# Patient Record
Sex: MALE | Race: WHITE | NOT HISPANIC OR LATINO
[De-identification: names, ages, dates, MRNs, and addresses within clinical notes are randomized per-mention and may not be internally consistent; named-entity substitution may affect disease eponyms.]

---

## 2018-10-17 PROBLEM — Z00.00 ENCOUNTER FOR PREVENTIVE HEALTH EXAMINATION: Status: ACTIVE | Noted: 2018-10-17

## 2018-11-20 ENCOUNTER — APPOINTMENT (OUTPATIENT)
Dept: CARDIOLOGY | Facility: CLINIC | Age: 18
End: 2018-11-20
Payer: COMMERCIAL

## 2018-11-20 ENCOUNTER — NON-APPOINTMENT (OUTPATIENT)
Age: 18
End: 2018-11-20

## 2018-11-20 VITALS
HEIGHT: 70 IN | HEART RATE: 111 BPM | DIASTOLIC BLOOD PRESSURE: 88 MMHG | SYSTOLIC BLOOD PRESSURE: 160 MMHG | WEIGHT: 151 LBS | BODY MASS INDEX: 21.62 KG/M2

## 2018-11-20 DIAGNOSIS — Z78.9 OTHER SPECIFIED HEALTH STATUS: ICD-10-CM

## 2018-11-20 DIAGNOSIS — Z82.49 FAMILY HISTORY OF ISCHEMIC HEART DISEASE AND OTHER DISEASES OF THE CIRCULATORY SYSTEM: ICD-10-CM

## 2018-11-20 PROCEDURE — 99204 OFFICE O/P NEW MOD 45 MIN: CPT

## 2018-11-20 PROCEDURE — 93000 ELECTROCARDIOGRAM COMPLETE: CPT

## 2018-11-20 NOTE — DISCUSSION/SUMMARY
[FreeTextEntry1] : Patient with sinus tachycardia, which he feels it is related to anxiety. He has labile blood pressure. He did subside after 10 minutes in the office. The heart however remains critical guard and then at rest. He had slight flushing of the face.\par \par For evaluation of labile hypertension and resting sinus tachycardia :\par - Check Holter recording. Check echocardiogram.\par - I have Given him a lab slip to check his TSH, CBC, electrolytes and serum metanephrines.\par - No medications were started today.\par - If he continues to have sinus tachycardia and flushing and high blood pressure, further evaluation may be necessary.\par \par The patient insists that his blood pressure at home is adequate. Also, his heart rate in the gym when he exercises his within normal limits before he starts exercising. No history of dizziness or syncope.\par \par \par \par Sincerely,\par Kemal Ott MD, FACC, RAJESH

## 2018-11-20 NOTE — HISTORY OF PRESENT ILLNESS
[FreeTextEntry1] : Dick is a pleasant 18-year-old male with no previous cardiovascular history. He is very active without exertional chest pain or shortness of breath or palpitations. He does have history of anxiety and sinus tachycardia and labile blood pressures, especially in doctor offices.\par \par Today, his resting heart rate was 111/min and initial blood pressure was 160/80 mmHg. After 10 minutes, the blood pressure subsided to 138/80. He does not have any palpitations other than when he is anxious. No dizziness or syncope. No exertional chest pain. No shortness of breath PND or orthopnea.

## 2018-11-20 NOTE — REASON FOR VISIT
[Consultation] : a consultation regarding [FreeTextEntry2] : Anxiety, sinus tachycardia, labile blood pressure

## 2018-11-20 NOTE — PHYSICAL EXAM
[General Appearance - Well Developed] : well developed [Normal Appearance] : normal appearance [Well Groomed] : well groomed [General Appearance - Well Nourished] : well nourished [No Deformities] : no deformities [General Appearance - In No Acute Distress] : no acute distress [Normal Conjunctiva] : the conjunctiva exhibited no abnormalities [Eyelids - No Xanthelasma] : the eyelids demonstrated no xanthelasmas [Normal Oral Mucosa] : normal oral mucosa [No Oral Pallor] : no oral pallor [No Oral Cyanosis] : no oral cyanosis [Murmurs] : no murmurs present [Edema] : no peripheral edema present [FreeTextEntry1] : S1-S2 is tachycardic and regular [Respiration, Rhythm And Depth] : normal respiratory rhythm and effort [Exaggerated Use Of Accessory Muscles For Inspiration] : no accessory muscle use [Auscultation Breath Sounds / Voice Sounds] : lungs were clear to auscultation bilaterally [Abdomen Soft] : soft [Abdomen Tenderness] : non-tender [Abnormal Walk] : normal gait [Gait - Sufficient For Exercise Testing] : the gait was sufficient for exercise testing [Nail Clubbing] : no clubbing of the fingernails [Cyanosis, Localized] : no localized cyanosis [Skin Color & Pigmentation] : normal skin color and pigmentation [Skin Turgor] : normal skin turgor [] : no rash [Oriented To Time, Place, And Person] : oriented to person, place, and time [Affect] : the affect was normal [Mood] : the mood was normal [Memory Recent] : recent memory was not impaired [No Anxiety] : not feeling anxious

## 2018-11-27 PROCEDURE — 93224 XTRNL ECG REC UP TO 48 HRS: CPT

## 2018-12-06 ENCOUNTER — APPOINTMENT (OUTPATIENT)
Dept: CARDIOLOGY | Facility: CLINIC | Age: 18
End: 2018-12-06
Payer: COMMERCIAL

## 2018-12-06 VITALS
HEIGHT: 70 IN | WEIGHT: 150 LBS | DIASTOLIC BLOOD PRESSURE: 62 MMHG | BODY MASS INDEX: 21.47 KG/M2 | SYSTOLIC BLOOD PRESSURE: 120 MMHG | HEART RATE: 123 BPM

## 2018-12-06 DIAGNOSIS — F41.9 ANXIETY DISORDER, UNSPECIFIED: ICD-10-CM

## 2018-12-06 PROCEDURE — 93306 TTE W/DOPPLER COMPLETE: CPT

## 2018-12-06 PROCEDURE — 99213 OFFICE O/P EST LOW 20 MIN: CPT

## 2018-12-06 NOTE — ASSESSMENT
[FreeTextEntry1] : - EKG 11/20/18: Sinus tachycardia. IRBBB\par - Holter recording November 2018: Average heart rate 71. No PAC or PVC. Appropriate transient sinus tachycardia.\par - Labs November 2018: Normal TSH and CBC. Metanephrine and normetanephrine levels were normal.

## 2018-12-06 NOTE — HISTORY OF PRESENT ILLNESS
[FreeTextEntry1] : Dick is a pleasant 18-year-old male with no previous cardiovascular history. He is very active without exertional chest pain or shortness of breath or palpitations. He does have history of anxiety and sinus tachycardia and labile blood pressures, especially in doctor offices.\par \par Today, his resting heart rate was 110/min and normal  blood pressure. On previous office visit, he had high blood pressure transiently which then subsided after resting for 10 minutes or so and also had sinus tachycardia. 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 Holter recording showed an average heart rate of 71 beats per minute\par \par He does not have any palpitations other than when he is anxious. No dizziness or syncope. No exertional chest pain. No shortness of breath PND or orthopnea Or flushing or sweats

## 2018-12-06 NOTE — DISCUSSION/SUMMARY
[FreeTextEntry1] : Patient with sinus tachycardia, which he feels it is related to anxiety. He has labile blood pressure. Blood In the office today is normal without any medications. He has sinus tachycardia when  he is anxious in the office however, 24-hour Holter monitor showed normal average heart rate when he is relaxed.\par \par He has good exercise capacity without any cardiac symptoms. Physical examination is unremarkable except sinus tachycardia.\par \par We discussed techniques for relaxation, meditation as well as biofeedback to relieve anxiety which will help his sinus  rate.\par \par The patient insists that his blood pressure at home is adequate. Also, his heart rate in the gym when he exercises his within normal limits before he starts exercising. No history of dizziness or syncope.\par \par Low-dose beta blockade can be tried for exaggerated sinus tachycardia although the patient does not have any symptoms of palpitations or any other symptoms from high heart rate.\par \par \par Sincerely,\par Kemal Ott MD, FACC, RAJESH

## 2018-12-06 NOTE — PHYSICAL EXAM
[General Appearance - Well Developed] : well developed [Normal Appearance] : normal appearance [Well Groomed] : well groomed [General Appearance - Well Nourished] : well nourished [No Deformities] : no deformities [General Appearance - In No Acute Distress] : no acute distress [Normal Conjunctiva] : the conjunctiva exhibited no abnormalities [Eyelids - No Xanthelasma] : the eyelids demonstrated no xanthelasmas [Normal Oral Mucosa] : normal oral mucosa [No Oral Pallor] : no oral pallor [No Oral Cyanosis] : no oral cyanosis [Respiration, Rhythm And Depth] : normal respiratory rhythm and effort [Exaggerated Use Of Accessory Muscles For Inspiration] : no accessory muscle use [Auscultation Breath Sounds / Voice Sounds] : lungs were clear to auscultation bilaterally [Murmurs] : no murmurs present [Edema] : no peripheral edema present [Abdomen Soft] : soft [Abdomen Tenderness] : non-tender [Abnormal Walk] : normal gait [Gait - Sufficient For Exercise Testing] : the gait was sufficient for exercise testing [Nail Clubbing] : no clubbing of the fingernails [Cyanosis, Localized] : no localized cyanosis [Skin Color & Pigmentation] : normal skin color and pigmentation [Skin Turgor] : normal skin turgor [] : no rash [Oriented To Time, Place, And Person] : oriented to person, place, and time [Affect] : the affect was normal [Mood] : the mood was normal [Memory Recent] : recent memory was not impaired [No Anxiety] : not feeling anxious [FreeTextEntry1] : S1-S2 is tachycardic and regular

## 2018-12-13 ENCOUNTER — APPOINTMENT (OUTPATIENT)
Dept: CARDIOLOGY | Facility: CLINIC | Age: 18
End: 2018-12-13

## 2018-12-13 ENCOUNTER — NON-APPOINTMENT (OUTPATIENT)
Age: 18
End: 2018-12-13

## 2018-12-13 DIAGNOSIS — R00.2 PALPITATIONS: ICD-10-CM

## 2018-12-13 DIAGNOSIS — R00.0 TACHYCARDIA, UNSPECIFIED: ICD-10-CM

## 2018-12-13 RX ORDER — AMOXICILLIN 875 MG/1
875 TABLET, FILM COATED ORAL
Qty: 20 | Refills: 0 | Status: ACTIVE | COMMUNITY
Start: 2018-12-10

## 2018-12-13 RX ORDER — PREDNISONE 50 MG/1
50 TABLET ORAL
Qty: 5 | Refills: 0 | Status: ACTIVE | COMMUNITY
Start: 2018-12-10

## 2018-12-13 RX ORDER — TOBRAMYCIN AND DEXAMETHASONE 3; 1 MG/ML; MG/ML
0.3-0.1 SUSPENSION/ DROPS OPHTHALMIC
Qty: 5 | Refills: 0 | Status: ACTIVE | COMMUNITY
Start: 2018-12-10

## 2018-12-13 NOTE — ASSESSMENT
[FreeTextEntry1] : - EKG 11/20/18: Sinus tachycardia. IRBBB\par - Holter recording November 2018: Average heart rate 71. No PAC or PVC. Appropriate transient sinus tachycardia.\par - Labs November 2018: Normal TSH and CBC. Metanephrine and normetanephrine levels were normal.\par - Echocardiogram to 618: Normal LV systolic function and wall motion. Normal diastolic function.\par - EKG 12/13/18: Sinus tachycardia.

## 2018-12-13 NOTE — HISTORY OF PRESENT ILLNESS
[FreeTextEntry1] : Dick is a pleasant 18-year-old male with no previous cardiovascular history. He is very active without exertional chest pain or shortness of breath or palpitations. He does have history of anxiety and sinus tachycardia and labile blood pressures, especially in doctor offices.\par \par Today, his resting heart rate was 140/min and normal  blood pressure. On previous office visit, He had sinus tachycardia. 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 Holter recording showed an average heart rate of 71 beats per minute\par \par He does not have any palpitations other than when he is anxious. No dizziness or syncope. No exertional chest pain. No shortness of breath PND or orthopnea Or flushing or sweats\par \par Echocardiogram was unremarkable

## 2018-12-13 NOTE — DISCUSSION/SUMMARY
[FreeTextEntry1] : Patient with sinus tachycardia, which he feels it is related to anxiety White coat syndrome only in this office. Blood In the office today is normal without any medications. He has sinus tachycardia when  he is anxious in the office however, 24-hour Holter monitor showed normal average heart rate when he is relaxed.\par \par He has good exercise capacity without any cardiac symptoms. Physical examination is unremarkable except sinus tachycardia.\par \par The patient insists that his blood pressure at home is adequate. Also, his heart rate in the gym when he exercises his within normal limits before he starts exercising. No history of dizziness or syncope.\par \par Low-dose beta blockade can be tried for exaggerated sinus tachycardia although the patient does not have any symptoms of palpitations or any other symptoms from high heart rate. He is reluctant for that. Other option is to address the issue of Stress and anxiety with Meditation, relaxation and if needed, specialist. \par \par Of note, his heart rate may be higher today also because of URI for which he is on antibiotics and prednisone.\par \par \par Sincerely,\par Kemal Ott MD, FACC, RAJESH

## 2019-02-06 ENCOUNTER — OUTPATIENT (OUTPATIENT)
Dept: OUTPATIENT SERVICES | Facility: HOSPITAL | Age: 19
LOS: 1 days | End: 2019-02-06